# Patient Record
Sex: MALE | Race: WHITE | Employment: FULL TIME | URBAN - METROPOLITAN AREA
[De-identification: names, ages, dates, MRNs, and addresses within clinical notes are randomized per-mention and may not be internally consistent; named-entity substitution may affect disease eponyms.]

---

## 2021-01-14 PROBLEM — M54.31 SCIATICA OF RIGHT SIDE: Status: ACTIVE | Noted: 2021-01-14

## 2021-08-19 ENCOUNTER — HOSPITAL ENCOUNTER (OUTPATIENT)
Dept: PHYSICAL THERAPY | Age: 56
Discharge: HOME | End: 2021-08-19
Payer: COMMERCIAL

## 2021-08-19 PROCEDURE — 97110 THERAPEUTIC EXERCISES: CPT

## 2021-08-19 PROCEDURE — 97140 MANUAL THERAPY 1/> REGIONS: CPT

## 2021-08-19 PROCEDURE — 97162 PT EVAL MOD COMPLEX 30 MIN: CPT

## 2021-08-19 NOTE — PROGRESS NOTES
[address] PHYS[facility] THERAPY AT [facility]  [address]  Phone [phone]  Fax [phone] / [address] [place] [address] PHYSICAL THERAPY SERVICES  Patient Name: Hank Gaston : 1965   Medical   Diagnosis: Low back pain [M54.5] Treatment Diagnosis: Low back pain   Onset Date: 2020     Referral Source: Nella Meyer MD Start of [facility]): 2021   Prior Hospitalization: See medical history Provider #: [id number]   Prior Level of Function: IND   Comorbidities: None reported   Medications: Verified on Patient Summary List   The Plan of Care and following information is based on the information from the initial evaluation.   =================================================================================  Assessment / key information:    Other Objective/Functional Measures:   Functional squat: 100%   SLS: R 24\", L 30\"     Physical Therapy Evaluation - Lumbar Spine ([facility])  ROM % AROM Comments:pain, area   Forward flexion 40-60 100 Pain    Extension 20-30 100     SB right 20-30 75 Pain    SB left 20-30 100 Decreased pain      Slump Test:     [x]? R    []? L    [x]? +    []? -  @ (degrees):   Prone Knee Bend:      []? R    []? L    []? +    []? -      Strength    L(0-5) R (0-5) N/T   Hip Flexion (L1,2) 5 4+ []?    Knee Extension (L3,4) 5 5 []?         Hip extension 4-/5 B with pain        Hip abduction 4+/5 B with pain      Hamstrings 90/90: -30 degrees B/L   L posterior innominate rotation, corrected with METS.        Pain Level (0-10 scale) post treatment: 6/10 at back.     ASSESSMENT/Changes in Function:   Upon evaluation, the pt presents with radicular pain to R foot and L gluteal region that increases his pain and decreases his driving, sitting, standing and walking tolerance. He has increased pain when performing work duties including bending, crawling and walking.  He presented with L posterior innominate rotation which was corrected with METS and AP mobs to R ASIS. He demonstrates decreased single L balance on the R, B HS tightness and mild decreases in R hip strength. Developed and instructed on HEP. The pt demonstrated good response to [facility] treatment today, reporting centralization of radicular symptoms from the toes on the R to the ankle. The pt will benefit from skilled OP PT to address functional limitations listed above and to return to pain free PLOF.     =================================================================================  Eval Complexity: History: MEDIUM  Complexity : 1-2 comorbidities / personal factors will impact the outcome/ POC Exam:MEDIUM Complexity : 3 Standardized tests and measures addressing body structure, function, activity limitation and / or participation in recreation  Presentation: MEDIUM Complexity : Evolving with changing characteristics  Clinical Decision Making:LOW Complexity : FOTO score of 75-100Overall Complexity:MEDIUM  Problem List: pain affecting function, decrease ROM, decrease strength, impaired gait/ balance and decrease flexibility/ joint mobility   Treatment Plan may include any combination of the following: Therapeutic exercise, Therapeutic activities, Neuromuscular re-education, Physical agent/modality, Manual therapy and Patient education  Patient / Family readiness to learn indicated by: asking questions  Persons(s) to be included in education: patient (P)  Barriers to Learning/Limitations: None  Measures taken:    Patient Goal (s): Loosen up and hopefully get Rt foot, toes feeling back   Patient self reported health status: good  Rehabilitation Potential: good  Short term goals to be accomplished in 4 visits:   1. Pt will be IND and compliant with HEP and self management of symptoms. 2. Pt will improve R SLS to at least 30\" as an indicator of improved LE stability.      Long term goals to be accomplished in 12 visits:   1.  The pt will improve R hip strength globally to 5/5 to allow for performance of recreational exercises. 2. The pt will report a 75% decrease in radicular symptoms to return to pain-free PLOF. 3. The pt will improve B HS length to -10 degrees to allow for ease with performing work duties. 4. The pt will improve his FOTO score to 82/100 as a functional indicator of improved mobility. Frequency / Duration:   Patient to be seen  2  times per week for 8-12  treatments: (All LTG as above will be assessed and updated every 10 visits or 30 days and progressed as needed)    Patient / Caregiver education and instruction: exercises  Therapist Signature: Santiago Michaels PT Date: 8/60/6624   Certification Period:  Time: 10:49 AM   ===========================================================================================  I certify that the above Physical Therapy Services are being furnished while the patient is under my care. I agree with the treatment plan and certify that this therapy is necessary. Physician Signature:        Date:       Time:     Please sign and return to [facility] or you may fax the signed copy to [phone]. Thank you.   Mariya Sandoval MD

## 2021-08-19 NOTE — PROGRESS NOTES
PT DAILY TREATMENT NOTE/LUMBAR EVAL     Patient Name: Asuncion Olson  Date:2021  : 1965  [x]  Patient  Verified  Payor: BLUE CROSS / Plan: [facility] PPO / Product Type: PPO /    In time: 8:33  Out time: 9:21  Total Treatment Time (min): 48  Visit #: 1 of     Medicare/BCBS Only   Total Timed Codes (min):  23 1:1 Treatment Time:  23     Treatment Area: Low back pain [M54.5]  SUBJECTIVE  Pain Level (0-10 scale): 7-8/10  []constant []intermittent []improving []worsening []no change since onset    Any medication changes, allergies to medications, adverse drug reactions, diagnosis change, or new procedure performed?: [x] No    [] Yes (see summary sheet for update)  Subjective functional status/changes:     Within the last year, the pt states he has R foot/toe tingling and numbness and pain at his R hip. He had an MRI and states MD reported he has 3 herniated disks. He states he cannot remember what caused the onset of his pain. 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 He tried laying flat on his stomach which helped alleviate some of his pain. He is required to bend over and crawl for work. He had 3 steroid injections, between 2021-2021. His pain currently is 7-8/10 at the center of his back with radicular pain to his R lateral toes and L glute. His pain at worst was 10/10, requiring him to go to the hospital. He states his pain is aggravated by getting up in the morning, driving, sitting, standing and walking for a prolonged period of time. His pain at best is 5/10, and his pain is alleviated with movement, tylenol, steroid injections, and anti-inflammatories. The pt's goals for PT are to return to pain free PLOF. FOTO: 83/100.      OBJECTIVE/EXAMINATION    Modality rationale: PD   Min Type Additional Details    [] Estim:  []Unatt       []IFC  []Premod                        []Other:  []w/ice []w/heat  Position:  Location:    [] Estim: []Att    []TENS instruct  []NMES                    []Other:  []w/US   []w/ice   []w/heat  Position:  Location:    []  Traction: [] Cervical       []Lumbar                       [] Prone          []Supine                       []Intermittent   []Continuous Lbs:  [] before manual  [] after manual    []  Ultrasound: []Continuous   [] Pulsed                           []1MHz   []3MHz Location:  W/cm2:    []  Iontophoresis with dexamethasone         Location: [] Take home patch   [] In clinic    []  Ice     []  heat  []  Ice massage  []  Laser   []  Anodyne Position:  Location:    []  Laser with stim  []  Other: Position:  Location:    []  Vasopneumatic Device Pressure:       [] lo [] med [] hi   Temperature: [] lo [] med [] hi   [] Skin assessment post-treatment:  []intact []redness- no adverse reaction    []redness  adverse reaction:     25 min [x]Eval                  []Re-Eval       15 min Therapeutic Exercise:  [x] See flow sheet :  HEP development  Prone lying  Prone on elbows    Rationale: increase ROM and increase strength to improve the patients ability to perform work duties    8 min Manual Therapy:  SIJ alignment, B HS stretching   The manual therapy interventions were performed at a separate and distinct time from the therapeutic activities interventions.   Rationale: decrease pain, increase ROM and increase tissue extensibility to perform work duties           With   [x] TE   [] TA   [] neuro   [] other: Patient Education: [x] Review HEP    [] Progressed/Changed HEP based on:   [] positioning   [] body mechanics   [] transfers   [] heat/ice application    [] other:      Other Objective/Functional Measures:   Functional squat: 100%   SLS: R 24\", L 30\"    Physical Therapy Evaluation - Lumbar Spine ([facility])  ROM % AROM Comments:pain, area   Forward flexion 40-60 100 Pain    Extension 20-30 100    SB right 20-30 75 Pain    SB left 20-30 100 Decreased pain     Slump Test: [x] R    [] L    [x] +    [] -  @ (degrees):   Prone Knee Bend: [] R    [] L    [] +    [] -     Strength   L(0-5) R (0-5) N/T   Hip Flexion (L1,2) 5 4+ []   Knee Extension (L3,4) 5 5 []        Hip extension 4-/5 B with pain        Hip abduction 4+/5 B with pain     Hamstrings 90/90: -30 degrees B/L   L posterior innominate rotation, corrected with METS. Pain Level (0-10 scale) post treatment: 6/10 at back. ASSESSMENT/Changes in Function:   Upon evaluation, the pt presents with radicular pain to R foot and L gluteal region that increases his pain and decreases his driving, sitting, standing and walking tolerance. He has increased pain when performing work duties including bending, crawling and walking. He presented with L posterior innominate rotation which was corrected with METS and AP mobs to R ASIS. He demonstrates decreased single L balance on the R, B HS tightness and mild decreases in R hip strength. Developed and instructed on HEP. The pt demonstrated good response to [facility] treatment today, reporting centralization of radicular symptoms from the toes on the R to the ankle. The pt will benefit from skilled OP PT to address functional limitations listed above and to return to pain free PLOF. Patient will continue to benefit from skilled PT services to modify and progress therapeutic interventions, address functional mobility deficits, address ROM deficits, address strength deficits, analyze and cue movement patterns and analyze and modify body mechanics/ergonomics to attain remaining goals. [x]  See Plan of Care  []  See progress note/recertification  []  See Discharge Summary         Progress towards goals / Updated goals:  Short term goals to be accomplished in 4 visits:   1. Pt will be IND and compliant with HEP and self management of symptoms. 2. Pt will improve R SLS to at least 30\" as an indicator of improved LE stability.      Long term goals to be accomplished in 12 visits: 1. The pt will improve R hip strength globally to 5/5 to allow for performance of recreational exercises. 2. The pt will report a 75% decrease in radicular symptoms to return to pain-free PLOF. 3. The pt will improve B HS length to -10 degrees to allow for ease with performing work duties. 4. The pt will improve his FOTO score to 82/100 as a functional indicator of improved mobility. PLAN  []  Upgrade activities as tolerated     []  Continue plan of care  []  Update interventions per flow sheet       []  Discharge due to:_  [x]  Other: Initiate PT 2x/week for 8-12 visits.      Justification for Eval Code Complexity:  Patient History : acute  Examination see exam   Clinical Presentation: evolving with changing characteristics  Clinical Decision Making : FOTO : 80 /100       Walker Plater, PT 8/19/2021  8:35 AM

## 2021-09-08 ENCOUNTER — APPOINTMENT (OUTPATIENT)
Dept: PHYSICAL THERAPY | Age: 56
End: 2021-09-08
Payer: COMMERCIAL

## 2021-09-13 ENCOUNTER — HOSPITAL ENCOUNTER (OUTPATIENT)
Dept: PHYSICAL THERAPY | Age: 56
Discharge: HOME | End: 2021-09-13
Payer: COMMERCIAL

## 2021-09-13 PROCEDURE — 97110 THERAPEUTIC EXERCISES: CPT

## 2021-09-13 PROCEDURE — 97140 MANUAL THERAPY 1/> REGIONS: CPT

## 2021-09-13 NOTE — PROGRESS NOTES
PT DAILY TREATMENT NOTE    Patient Name: Ruth Osuna  Date:2021  : 1965  [x]  Patient  Verified  Payor: BLUE CROSS / Plan: [facility][1] PPO / Product Type: PPO /    In time: 5:45  Out time: 6:30  Total Treatment Time (min): 45  Visit #: 2 of -    Medicare/BCBS Only   Total Timed Codes (min):  45 1:1 Treatment Time:  45     Treatment Area: Low back pain [M54.5]  SUBJECTIVE  Pain Level (0-10 scale): 2/10 numbness in last 3 toes  []constant []intermittent []improving []worsening []no change since onset    Any medication changes, allergies to medications, adverse drug reactions, diagnosis change, or new procedure performed?: [] No    [] Yes (see summary sheet for update)  Subjective functional status/changes:     Patient reports he has been doing so of his exercises. He has numbness in the last 3 toes on the right foot. Occasionally he gets some radiating sx in left leg.      OBJECTIVE/EXAMINATION    Modality rationale: PD   Min Type Additional Details    [] Estim:  []Unatt       []IFC  []Premod                        []Other:  []w/ice   []w/heat  Position:  Location:    [] Estim: []Att    []TENS instruct  []NMES                    []Other:  []w/US   []w/ice   []w/heat  Position:  Location:    []  Traction: [] Cervical       []Lumbar                       [] Prone          []Supine                       []Intermittent   []Continuous Lbs:  [] before manual  [] after manual    []  Ultrasound: []Continuous   [] Pulsed                           []1MHz   []3MHz Location:  W/cm2:    []  Iontophoresis with dexamethasone         Location: [] Take home patch   [] In clinic    []  Ice     []  heat  []  Ice massage  []  Laser   []  Anodyne Position:  Location:    []  Laser with stim  []  Other: Position:  Location:    []  Vasopneumatic Device Pressure:       [] lo [] med [] hi   Temperature: [] lo [] med [] hi   [] Skin assessment post-treatment:  []intact []redness- no adverse reaction []redness  adverse reaction:     37 min Therapeutic Exercise:  [x] See flow sheet :  HEP development  Prone lying  Prone on elbows    Rationale: increase ROM and increase strength to improve the patients ability to perform work duties    8 min Manual Therapy:  SIJ check, B HS stretching, manual lumbar traction   The manual therapy interventions were performed at a separate and distinct time from the therapeutic activities interventions. Rationale: decrease pain, increase ROM and increase tissue extensibility to perform work duties           With   [x] TE   [] TA   [] neuro   [] other: Patient Education: [x] Review HEP    [] Progressed/Changed HEP based on:   [] positioning   [] body mechanics   [] transfers   [] heat/ice application    [] other:      Other Objective/Functional Measures:   - prone progression decreased radicular sx and increased LBP  - manual traction abolished sx     Pain Level (0-10 scale) post treatment: 1/10 \"I can feel my toes again! \"    ASSESSMENT/Changes in Function:   Initiated PT POC today per flow sheet, requiring vc and demo 100% of the time for proper form and technique with TE. Positive response to prone progression and manual lumbar traction. Pt is agreeable to trial of mechanical lumbar traction NV and would benefit from further education on anatomy and physiology of herniated discs and how prone progression and traction work. Patient will continue to benefit from skilled PT services to modify and progress therapeutic interventions, address functional mobility deficits, address ROM deficits, address strength deficits, analyze and cue movement patterns and analyze and modify body mechanics/ergonomics to attain remaining goals. [x]  See Plan of Care  []  See progress note/recertification  []  See Discharge Summary         Progress towards goals / Updated goals:  Short term goals to be accomplished in 4 visits:   1.  Pt will be IND and compliant with HEP and self management of symptoms. Current: pt reports compliance. 9/13/21  2. Pt will improve R SLS to at least 30\" as an indicator of improved LE stability. Current: not initiated at this time. 9/13/21    Long term goals to be accomplished in 12 visits:   1. The pt will improve R hip strength globally to 5/5 to allow for performance of recreational exercises. 2. The pt will report a 75% decrease in radicular symptoms to return to pain-free PLOF. 3. The pt will improve B HS length to -10 degrees to allow for ease with performing work duties. 4. The pt will improve his FOTO score to 82/100 as a functional indicator of improved mobility.       PLAN  []  Upgrade activities as tolerated     [x]  Continue plan of care  []  Update interventions per flow sheet       []  Discharge due to:_  [x]  Other: trial of mechanical lumbar tx NV    RINA Murphy 9/13/2021  6:30 PM

## 2021-09-16 ENCOUNTER — HOSPITAL ENCOUNTER (OUTPATIENT)
Dept: PHYSICAL THERAPY | Age: 56
Discharge: HOME | End: 2021-09-16
Payer: COMMERCIAL

## 2021-09-16 PROCEDURE — 97110 THERAPEUTIC EXERCISES: CPT

## 2021-09-16 PROCEDURE — 97140 MANUAL THERAPY 1/> REGIONS: CPT

## 2021-09-16 NOTE — PROGRESS NOTES
PHYSICAL THERAPY - DAILY TREATMENT NOTE    Patient Name: Michael Dose        Date: 2021  : 1965   YES Patient  Verified  Visit #:   3     Insurance: Payor: Marlene Gómez / Plan: [facility] PPO / Product Type: PPO /      In time:  Out time: 630   Total Treatment Time: 50     Medicare/BCBS Time Tracking (below)   Total Timed Codes (min):  50 1:1 Treatment Time:  45     TREATMENT AREA = Low back pain [M54.5]    SUBJECTIVE    Pain Level (on 0 to 10 scale):  1  / 10   Medication Changes/New allergies or changes in medical history, any new surgeries or procedures? NO    If yes, update Summary List   Subjective Functional Status/Changes:  []  No changes reported     \"I did some hamstring curls which made my back pain feel better\"          OBJECTIVE    Therapeutic Procedures:  Min Procedure Specifics + Rationale   n/a [x]  Patient Education (performed throughout session) [x] Review HEP    [] Progressed/Changed HEP based on:   [] proper performance and advancement of Therex/TA   [] reduction in pain level    [] increased functional capacity       [] change in directional preference   40(35) [x] Therapeutic Exercise   [x]  See Flowsheet   Rationale: increase ROM and increase strength to improve the patients ability to participate in ADL's    10 [x]  Manual Therapy       Grade 1-4 P/A mobs along T/S and L/S. IASTM along bilateral T/S and L/S paraspinals    Rationale: decrease pain, increase ROM, increase tissue extensibility, decrease trigger points and increase postural awareness to attain functional use and participation with ADL's     Other Objective/Functional Measures:    PT noted significant lower T/S and L/S segmental mobility deficits. PT held the addition of mechanical traction d/t positive response to therex. Max vcs to perform exercises slow and controlled. See flow sheet for more details. Progressed therex per flow sheet.    Post Treatment Pain Level (on 0 to 10) scale:   0  / 10     ASSESSMENT    Assessment/Changes in Function:     Pt continues to respond well to therex and extension based program. Pt able to abolish right LE radicular symptoms post manual therapy and REIL. PT to be re-evaluated NV.     []  See Plan of Care  []  See Progress Note/ Recertification  []  See Discharge Summary        Patient will continue to benefit from skilled PT services to modify and progress therapeutic interventions, address functional mobility deficits, address ROM deficits, address strength deficits, analyze and address soft tissue restrictions, analyze and cue movement patterns, analyze and modify body mechanics/ergonomics, assess and modify postural abnormalities, address imbalance/dizziness and instruct in home and community integration  to attain remaining goals   Progress toward goals / Updated goals:    Pt to be re-evaluated NV  Short term goals to be accomplished in 4 visits:   1. Pt will be IND and compliant with HEP and self management of symptoms. Current: pt reports compliance. 9/13/21  2. Pt will improve R SLS to at least 30\" as an indicator of improved LE stability. Current: not initiated at this time. 9/13/21     Long term goals to be accomplished in 12 visits:   1. The pt will improve R hip strength globally to 5/5 to allow for performance of recreational exercises. 2. The pt will report a 75% decrease in radicular symptoms to return to pain-free PLOF. 3. The pt will improve B HS length to -10 degrees to allow for ease with performing work duties. 4. The pt will improve his FOTO score to 82/100 as a functional indicator of improved mobility.       PLAN    [x]  Upgrade activities as tolerated  [x]  Update interventions per flow sheet YES Continue plan of care   []  Discharge due to :    []  Other:      Therapist: Jarrett Salgado DPT    Date: 9/16/2021 Time: 4:17 PM     Future Appointments   Date Time Provider Sarah Morrell   9/16/2021  5:45 PM [facility]   9/20/2021  5:45 PM [facility]

## 2021-09-20 ENCOUNTER — APPOINTMENT (OUTPATIENT)
Dept: PHYSICAL THERAPY | Age: 56
End: 2021-09-20
Payer: COMMERCIAL

## 2021-09-27 ENCOUNTER — HOSPITAL ENCOUNTER (OUTPATIENT)
Dept: PHYSICAL THERAPY | Age: 56
End: 2021-09-27
Payer: COMMERCIAL

## 2021-10-21 NOTE — PROGRESS NOTES
[address] PHYSICAL THERAPY AT [facility]  [address] [facility] [address] [place] [address], [address]  Phone [phone]  Fax [phone]  DISCHARGE SUMMARY  Patient Name: Elizabeth Quigley : 1965   Treatment/Medical Diagnosis: Low back pain [M54.5]   Referral Source: Pooja Holden MD     Date of Initial Visit: 21 Attended Visits: 3 Missed Visits: 3     SUMMARY OF TREATMENT  Thank you for this referral. The pt has been seen for 3 visits to address low back pain. Therapeutic interventions have included therapeutic exercise, therapeutic activity, neuromuscular re-education, manual treatment, modalities and patient education. CURRENT STATUS  Unable to formally assess therapy goals as the pt did not return to PT after the third visit and not contact the clinic for further need of therapy. Please refer to initial evaluation on 21 for last assessed objective measures. The pt will be discharged at this time, and if the patient contacts the office after today, he will be advised that if any additional follow up or recommendation is needed that he will need to return to the referring physician. RECOMMENDATIONS  Discontinue therapy due to lack of attendance or compliance. If you have any questions/comments please contact us directly at [phone]. Thank you for allowing us to assist in the care of your patient.     Therapist Signature: All Treviño PT Date: 10/21/21     Time: 11:05 AM